# Patient Record
(demographics unavailable — no encounter records)

---

## 2024-10-10 NOTE — ASSESSMENT
[FreeTextEntry1] : 4 weeks status post right fourth metacarpal neck fracture with change in alignment.  There appears to be clinical union of the fracture site.  Options were discussed ranging from conservative management to surgical intervention.  Risk associate with each option discussed and all questions answered.  She elected observation since the bone is already healed and she will begin a early weaning process as well as referral to outside therapy.  Return to the office in 3 weeks to ensure she is satisfied with the new position.  If she is not satisfied she is considering osteotomy with ORIF.  Earlier if there are any issues or concerns discussed

## 2024-10-10 NOTE — HISTORY OF PRESENT ILLNESS
[Right] : right hand dominant [FreeTextEntry1] : Date of injury: 9/11/2024 (4 weeks 1 day status post injury)  Patient is here 4 weeks 1 day status post right fifth metacarpal neck fracture with acceptable alignment. She presents in a splint.  Patient states that she tweaked the finger in the splint 5 days after being evaluated but did not notice any major changes in alignment and therefore continue to treat herself in the splint.
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
## 2024-10-30 NOTE — HISTORY OF PRESENT ILLNESS
[Right] : right hand dominant [FreeTextEntry1] : DOI: 09/11/2024  Patient is 7 weeks status post right fifth metacarpal neck fracture with acceptable alignment. Patient has been on own home program for 2 weeks.

## 2024-10-30 NOTE — ASSESSMENT
[FreeTextEntry1] : 7 weeks status post right fifth metacarpal fracture with solid union.  Encourage patient to follow-up with therapist.  She has her first appointment tomorrow.  Return to the office in 4 to 6 weeks if symptoms do not gradually resolve otherwise on an as-needed basis.

## 2024-10-30 NOTE — PHYSICAL EXAM
[de-identified] : The wrists have a symmetric range of motion bilaterally. There is no tenderness over the scaphoid, scapholunate or lunotriquetral ligaments bilaterally. There is a negative Atkins test bilaterally. There is negative tenderness over the radial ulnar joint or TFCC and no evidence of instability bilaterally. No tenderness over the pisotriquetral or hamate hook or CMC joint bilaterally. There is 5 over 5 strength of the wrists bilaterally.  There is no tenderness at the fracture site no tenderness over the MP PIP or DIP joint with full range of motion of the digits active equaling passive range of motion.  No tenderness over the A1 pulley.   strength 60 pounds symmetric bilaterally Right hand dominant  Good capillary refill sensation grossly intact [de-identified] : PA lateral and oblique of the right hand shows increased callus formation at the fracture site without significant change in alignment

## 2025-06-10 NOTE — REASON FOR VISIT
[Initial Consultation] : an initial consultation for [FreeTextEntry2] : chronic leukopenia antiphospholipid syndrome, history of anemia, thrombocytopenia, autoimmune disorder                         chronic leukopenia  history of anemia   thrombocytopenia  autoimmune disorders

## 2025-06-10 NOTE — REVIEW OF SYSTEMS
[Difficulty Walking] : difficulty walking [Easy Bruising] : a tendency for easy bruising [Negative] : Allergic/Immunologic [Fever] : no fever [Chills] : no chills [Fatigue] : fatigue [Eye Pain] : no eye pain [Dry Eyes] : no dryness of the eyes [Vision Problems] : no vision problems [Dysphagia] : no dysphagia [Loss of Hearing] : no loss of hearing [Nosebleeds] : no nosebleeds [Chest Pain] : no chest pain [Palpitations] : no palpitations [Lower Ext Edema] : no lower extremity edema [Shortness Of Breath] : no shortness of breath [Cough] : no cough [Abdominal Pain] : no abdominal pain [Dysuria] : no dysuria [Skin Rash] : no skin rash [Easy Bleeding] : no tendency for easy bleeding [FreeTextEntry9] : Hyperextensible joints [de-identified] : numbness down right leg to foot following cramping sensation   not painful   affecting gait

## 2025-06-10 NOTE — HISTORY OF PRESENT ILLNESS
[de-identified] : Patient is a 42 year old female with a history of Suzanne- Danlos syndrome, juvenile rheumatoid arthritis, positive antiphospholipid antibodies, iron deficiency s/p bariatric surgery (2009), Hashimoto's thyroiditis, migraine with aura, chronic leukopenia and iron deficiency, (February 2020) surgery for presumed intussusception (second time, found to have bowel inflammation and endometriosis for which she received Lupron injection), Covid-19 infection in 2023 and 2025, who now presents for evaluation of chronic leukopenia and antiphospholipid syndrome. At the last full visit in March of 2019, the white blood count was in the normal range at 4.51, with a hemoglobin of 10.1, hematocrit of 32.5, MCV of 102.5 and a normal platelet count of 340,000. APTT was 51.2. PT and INR were normal at that time. Since the last visit, patient developed a large ventral hernia which was repaired robotically with insertion of a mesh. She subsequently developed a staph infection (MSSA) and required a prolonged course of treatment with Doxycycline. She was referred to infectious disease specialist, who then placed patient on a 2-month course of Rifampin without improvement. 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 Patient recently had a colonoscopy with some preliminary biopsy results reported as normal. In April of 2025, patient was found to have a white blood count of 2.0 with slightly low monocytes in the differential, hemoglobin of 11.9, hematocrit of 36, .7 and a platelet count 133,000.  CMP was essentially normal. DOV was positive 1:80 titer and beta-2 glycoprotein 1 IgM body was positive at 103.8. Iron panel was significant for a saturation of 49% and ferritin was 91. Immunoglobulin panel was normal, and no monoclonal proteins were detected. Tryptase and histamine levels were normal. INR and PTT were also normal. Lupus anticoagulant studies were negative at the time. She has lost 29 pounds since the last visit in 2019.  Patient recently had discomfort and cramping in the distal right lower extremities followed by lateral numbness and partial foot drop. She will see a neurologist in the upcoming weeks. Patient feels fatigued and she complains of chronic brain fog and dryness of the mouth.. Patient is presently on low dose aspirin, and she admits to bruising easily with trauma. She is also supplementing vitamin B12 + folic acid. There is a family history of breast cancer (maternal grandmother), thyroid disease (mother, sister and maternal grandmother), cardiac disorder (paternal grandmother) and cerebrovascular accident (maternal cousin). Denies fever, chills, drenching night sweats, hematuria, hematochezia, epistaxis, gingival bleeding, chest pain, palpitations, abdominal pain, tinnitus or shortness of breath.

## 2025-06-10 NOTE — ASSESSMENT
[FreeTextEntry1] : Patient is a 42 year old female with a history of Suzanne- Danlos syndrome, juvenile rheumatoid arthritis, positive antiphospholipid antibodies, iron deficiency s/p bariatric surgery (2009), Hashimoto's thyroiditis, migraine with aura, chronic leukopenia and iron deficiency, (February 2020) surgery for presumed intussusception (second time, found to have bowel inflammation and endometriosis for which she received Lupron injection), robotic hernia repair with subsequent infections requiring prolonged antibiotics and mesh removal, Covid-19 infection in 2023 and 2025, who now presents for evaluation of chronic leukopenia, anemia and antiphospholipid syndrome. Anemia has been intermittent and has been attributed in the past to iron deficiency status post bariatric surgery, chronic illness including autoimmune disorders and chronic postoperative infections, gastrointestinal issues. Will also evaluate for hemolysis.  Leukopenia is chronic and persistent likely due to her autoimmune disorders, nutritional deficiencies, abnormal zinc or copper levels, medications , chronic viral illness  Have ordered B12 and folate, B2MG, CBC with auto differential, CMP, CRP, ESR, ferritin, flow cytometry, haptoglobin, iron panel, LDH, manual differential, reticulocyte count, uric acid, activated partial thromboplastin time, serum copper, platelet antibodies test, prothrombin time and INR and zinc level. Venipuncture was performed in the office today. Patient was advised to call the office to discuss results and further recommendations.

## 2025-06-10 NOTE — PHYSICAL EXAM
[Normal] : grossly intact [de-identified] : conjunctival pallor [de-identified] : abdominal scars, mild diffuse tenderness, no masses appreciated. ? hernia [de-identified] : hyperextensible joints [de-identified] : warm, dry, pale ecchymoses lower extremity   abdominal scars